# Patient Record
Sex: FEMALE | Race: WHITE | NOT HISPANIC OR LATINO | URBAN - METROPOLITAN AREA
[De-identification: names, ages, dates, MRNs, and addresses within clinical notes are randomized per-mention and may not be internally consistent; named-entity substitution may affect disease eponyms.]

---

## 2020-09-01 ENCOUNTER — TELEPHONE ENCOUNTER (OUTPATIENT)
Dept: URBAN - METROPOLITAN AREA CLINIC 92 | Facility: CLINIC | Age: 45
End: 2020-09-01

## 2020-09-01 NOTE — HPI-TODAY'S VISIT:
This is a scheduled follow-up appointment for this patient, a 44 year old /White female, after a previous visit on May 2020, for an evaluation for hepatitis C Ab pos but pcr negative state. A copy of this note has been sent to the referring provider. She was diagnosed with hepatitis C after routine blood testing.  She is asymptomatic. The patient denies abdominal pain and nausea She denies alcohol use. she denies illegal drug use presently. She denies a prior history of illicit drug use.  The patient has not had any previous evaluation for hepatitis C.     Pt here for hep c reported hx.  RECAP: She was dx in 2010 after ob visit.  She didn't receive tx at that time.  Two years later she was checked again and still positive.   She had a baby in 2007 and was not told she had hep c then.  Oct 31 2018 mri with left adrenal nodule 3.2x1.9cm and they recommend redo mri in 12 m for this. No hepatic steatosis seen and possible focus of shunting and they recommend follow up. area was 9mm size and liver fat 3% and spleen normal.  Mri done as prior u/s suspected in sept 2018 cirrhosis and diffuse fat. Nodularity was seen and no lesions. no hydronephrosis and visualized pancreas normal. Vessels patent.  So very discordant mri to u.s so avoiding u.s in her as not able to follow with that modality.  Sept 2018 hcv ab neg and hep a immune and hep b immune 35 and hcv pcr negative.  July 2019 mri:   Document Type: MRI Abdomen w/ + w/o Contrast Document Date: July 25, 2019 14:55  Document Status: Auth (Verified) Document Title: MRI Abdomen w/ + w/o Contrast Performed By: Argelia Fairchild  Verified By: Argelia Fairchild on July 25, 2019 16:32  Encounter info: [id number], [facility], Single Visit OP, 7/25/2019 - 7/25/2019   * Final Report *  Reason For Exam ADRENAL NODULE  REPORT EXAM: MRI Abdomen w/ + w/o Contrast  CLINICAL INDICATION: ADRENAL NODULE.  TECHNIQUE: Multisequence, multiplanar MRI of the abdomen was performed without and with intravenous contrast. ESRC.2.7.3   CONTRAST: 15 cc of Prohance  COMPARISON: None  FINDINGS:  Lower Thorax: Normal.  Liver: No fat or iron. Normal.   Gallbladder/Biliary Tree: Normal.  Spleen: Normal.  Pancreas: Normal.  Adrenal Glands: Within the left adrenal gland, there is a 3.4 x 2 cm enhancing nodule with intravoxel fat (drop in signal on out of phase images), compatible with a lipid rich adenoma.   Kidneys/Ureters: Normal.  Gastrointestinal: Normal.   Lymph Nodes: Normal.  Vessels: Normal.  Peritoneum/Retroperitoneum: Normal.  Bones/Soft Tissues: Normal.  IMPRESSION:     A 3.4 cm lipid rich adenoma within the left adrenal gland.    Signature Line *** Final ***  Electronically Signed By:  Argelia Fairchild on  07/25/2019 16:32  Dictated by:  Argelia Fairchild  Sees Endocrine sept 4 2019 at [place].  March 2019 glu 97 bun 10 and cr 0.68 and na 138 and k 4 and alb 4.3 and tb 0.6 and alk 96 and ast 12 and alt 11.   8/28/28 labs na 139 k 4.2 tb 0.7 alp 92 ast 12 alt 11 hemoglobin a1c 5.2% hep a ab igm neg hep b core ab igm neg hep b surf ag neg hep c ab positive 1.86, pcr negative total cholesterol 230 ldl 146 trig 155 hdl 55 tsh 0.66 vit d low 14 wbc 8.2 hgb 13.3 plt 282 gluc 91 cr 0.68  We reviewed with the pt re findings.   Plan is for a 1 yr visit and she will follow with endocrine for the adenoma and if all stable then she will come back as prn.  Duration of visit  mins with over 50% of the time explaining pt's condition and treatment plan and reviewing records.

## 2020-09-03 ENCOUNTER — OFFICE VISIT (OUTPATIENT)
Dept: URBAN - METROPOLITAN AREA TELEHEALTH 2 | Facility: TELEHEALTH | Age: 45
End: 2020-09-03

## 2020-10-18 ENCOUNTER — TELEPHONE ENCOUNTER (OUTPATIENT)
Dept: URBAN - METROPOLITAN AREA CLINIC 92 | Facility: CLINIC | Age: 45
End: 2020-10-18

## 2020-10-18 NOTE — HPI-TODAY'S VISIT:
This is a scheduled follow-up appointment for this patient, a 45 year old /White female, after a previous visit on Oct 2019 , for an evaluation for hepatitis C Ab pos but pcr negative state. A copy of this note has been sent to the referring provider. She was diagnosed with hepatitis C after routine blood testing.  She is asymptomatic. The patient denies abdominal pain and nausea She denies alcohol use. she denies illegal drug use presently. She denies a prior history of illicit drug use.  The patient has not had any previous evaluation for hepatitis C.     Pt here for hep c reported hx.  RECAP: She was dx in 2010 after ob visit.  She didn't receive tx at that time.  Two years later she was checked again and still positive.   She had a baby in 2007 and was not told she had hep c then.  Oct 31 2018 mri with left adrenal nodule 3.2x1.9cm and they recommend redo mri in 12 m for this. No hepatic steatosis seen and possible focus of shunting and they recommend follow up. area was 9mm size and liver fat 3% and spleen normal.  Mri done as prior u/s suspected in sept 2018 cirrhosis and diffuse fat. Nodularity was seen and no lesions. no hydronephrosis and visualized pancreas normal. Vessels patent.  So very discordant mri to u.s so avoiding u.s in her as not able to follow with that modality.  Sept 2018 hcv ab neg and hep a immune and hep b immune 35 and hcv pcr negative.  July 2019 mri:   Document Type: MRI Abdomen w/ + w/o Contrast Document Date: July 25, 2019 14:55  Document Status: Auth (Verified) Document Title: MRI Abdomen w/ + w/o Contrast Performed By: Argelia Fairchild  Verified By: Argelia Fairchild on July 25, 2019 16:32  Encounter info: [id number], [facility], Single Visit OP, 7/25/2019 - 7/25/2019   * Final Report *  Reason For Exam ADRENAL NODULE  REPORT EXAM: MRI Abdomen w/ + w/o Contrast  CLINICAL INDICATION: ADRENAL NODULE.  TECHNIQUE: Multisequence, multiplanar MRI of the abdomen was performed without and with intravenous contrast. ESRC.[id number]   CONTRAST: 15 cc of Prohance  COMPARISON: None  FINDINGS:  Lower Thorax: Normal.  Liver: No fat or iron. Normal.   Gallbladder/Biliary Tree: Normal.  Spleen: Normal.  Pancreas: Normal.  Adrenal Glands: Within the left adrenal gland, there is a 3.4 x 2 cm enhancing nodule with intravoxel fat (drop in signal on out of phase images), compatible with a lipid rich adenoma.   Kidneys/Ureters: Normal.  Gastrointestinal: Normal.   Lymph Nodes: Normal.  Vessels: Normal.  Peritoneum/Retroperitoneum: Normal.  Bones/Soft Tissues: Normal.  IMPRESSION:     A 3.4 cm lipid rich adenoma within the left adrenal gland.    Signature Line *** Final ***  Electronically Signed By:  Argelia Fairchild on  07/25/2019 16:32  Dictated by:  Argelia Fairchild  Sees Endocrine sept 4 2019 at [place].  March 2019 glu 97 bun 10 and cr 0.68 and na 138 and k 4 and alb 4.3 and tb 0.6 and alk 96 and ast 12 and alt 11.   8/28/28 labs na 139 k 4.2 tb 0.7 alp 92 ast 12 alt 11 hemoglobin a1c 5.2% hep a ab igm neg hep b core ab igm neg hep b surf ag neg hep c ab positive 1.86, pcr negative total cholesterol 230 ldl 146 trig 155 hdl 55 tsh 0.66 vit d low 14 wbc 8.2 hgb 13.3 plt 282 gluc 91 cr 0.68  We reviewed with the pt re findings.   Plan is for a 1 yr visit and she will follow with endocrine for the adenoma and if all stable then she will come back as prn.  Duration of visit  mins with over 50% of the time explaining pt's condition and treatment plan and reviewing records.

## 2020-10-23 ENCOUNTER — LAB OUTSIDE AN ENCOUNTER (OUTPATIENT)
Dept: URBAN - METROPOLITAN AREA TELEHEALTH 2 | Facility: TELEHEALTH | Age: 45
End: 2020-10-23

## 2020-10-23 ENCOUNTER — OFFICE VISIT (OUTPATIENT)
Dept: URBAN - METROPOLITAN AREA TELEHEALTH 2 | Facility: TELEHEALTH | Age: 45
End: 2020-10-23
Payer: COMMERCIAL

## 2020-10-23 DIAGNOSIS — Z71.89 VACCINE COUNSELING: ICD-10-CM

## 2020-10-23 DIAGNOSIS — E78.5 HYPERLIPIDEMIA: ICD-10-CM

## 2020-10-23 DIAGNOSIS — R76.8 HCV ANTIBODY POSITIVE: ICD-10-CM

## 2020-10-23 DIAGNOSIS — E66.3 OVERWEIGHT: ICD-10-CM

## 2020-10-23 DIAGNOSIS — Z79.899 HIGH RISK MEDICATION USE: ICD-10-CM

## 2020-10-23 DIAGNOSIS — K76.0 FATTY LIVER: ICD-10-CM

## 2020-10-23 DIAGNOSIS — E55.9 VITAMIN D DEFICIENCY: ICD-10-CM

## 2020-10-23 DIAGNOSIS — D32.9 MENINGIOMA: ICD-10-CM

## 2020-10-23 DIAGNOSIS — R93.2 ABNORMAL ULTRASOUND OF LIVER: ICD-10-CM

## 2020-10-23 DIAGNOSIS — K76.9 LIVER LESION: ICD-10-CM

## 2020-10-23 DIAGNOSIS — E27.8 ADRENAL NODULE: ICD-10-CM

## 2020-10-23 PROCEDURE — G8482 FLU IMMUNIZE ORDER/ADMIN: HCPCS

## 2020-10-23 PROCEDURE — G8417 CALC BMI ABV UP PARAM F/U: HCPCS

## 2020-10-23 PROCEDURE — G8427 DOCREV CUR MEDS BY ELIG CLIN: HCPCS

## 2020-10-23 PROCEDURE — 99214 OFFICE O/P EST MOD 30 MIN: CPT

## 2020-10-23 PROCEDURE — G9903 PT SCRN TBCO ID AS NON USER: HCPCS

## 2020-10-23 NOTE — HPI-TODAY'S VISIT:
This is a scheduled follow-up appointment for this patient, a 45 year old /White female, after a previous visit on Oct 2019 , for an evaluation for hepatitis C Ab pos but pcr negative state and hx of adrenal nodule.  A copy of this note has been sent to the referring provider.   Dr eBss is primary.  RECAP:  She was diagnosed with hepatitis C after routine blood testing.    She is asymptomatic. She had no exposures.  The patient has not had any previous evaluation for hepatitis C.    Pt here for hep c reported hx.   She was dx in 2010 after ob visit.  She didn't receive tx at that time.  Two years later she was checked again and still positive.   She had a baby in 2007 and was not told she had hep c then.  Interval results:    		 Document Type:	CT Abdomen w/o IV Contrast Document Date:	September 28, 2020 13:19  Document Status:	Auth (Verified) Document Title:	CT Abdomen w/o IV Contrast Performed By:	Jeniffer Smith  Verified By:	Jeniffer Smith on September 28, 2020 13:33  Encounter info:	[id number][facility], 9/28/2020 - 9/28/2020   * Final Report *  Reason For Exam Other;Other; Other; 3.4 cm left adrenal nodule  REPORT EXAM: CT Abdomen w/o IV Contrast  CLINICAL INDICATION: Left adrenal nodule seen on MRI  TECHNIQUE: Noncontrast images were obtained through the abdomen. ESRC.1.7.2  COMPARISON: 7/25/2019  FINDINGS:  Lower Thorax: Normal.  Liver: Normal.  Gallbladder/Biliary Tree: Normal.  Spleen: Normal.  Pancreas: Normal.  Adrenal Glands: Unchanged left adrenal lipid rich adenoma measuring 3.4 x 2.0 cm.   Kidneys/Ureters: Normal.  Gastrointestinal: Normal.   Lymph Nodes: Normal.  Vessels: Normal.  Peritoneum/Retroperitoneum: Normal.  Bones/Soft Tissues: Normal.  IMPRESSION:  Unchanged left adrenal lipid-rich adenoma    Signature Line *** Final ***  Electronically Signed By:  Jeniffer Simth on  09/28/2020 13:33  Dictated by:  Jeniffer Smith  [place] endocrine ordered this and she says told that it was unchanged and plan to relook at it in a year.  Document Type:	MRI Abdomen w/ + w/o Contrast Document Date:	July 25, 2019 14:55  Document Status:	Auth (Verified) Document Title:	MRI Abdomen w/ + w/o Contrast Performed By:	Argelia Fairchild  Verified By:	Argelia Fairchild on July 25, 2019 16:32  Encounter info:	[id number], [facility] Single Visit OP, 7/25/2019 - 7/25/2019   * Final Report *  Reason For Exam ADRENAL NODULE  REPORT EXAM: MRI Abdomen w/ + w/o Contrast  CLINICAL INDICATION: ADRENAL NODULE.  TECHNIQUE: Multisequence, multiplanar MRI of the abdomen was performed without and with intravenous contrast. ESRC.2.7.3   CONTRAST: 15 cc of Prohance  COMPARISON: None  FINDINGS:  Lower Thorax: Normal.  Liver: No fat or iron. Normal.   Gallbladder/Biliary Tree: Normal.  Spleen: Normal.  Pancreas: Normal.  Adrenal Glands: Within the left adrenal gland, there is a 3.4 x 2 cm enhancing nodule with intravoxel fat (drop in signal on out of phase images), compatible with a lipid rich adenoma.   Kidneys/Ureters: Normal.  Gastrointestinal: Normal.   Lymph Nodes: Normal.  Vessels: Normal.  Peritoneum/Retroperitoneum: Normal.  Bones/Soft Tissues: Normal.  IMPRESSION:     A 3.4 cm lipid rich adenoma within the left adrenal gland.    Signature Line *** Final ***  Electronically Signed By:  Argelia Fairchild on  07/25/2019 16:32  Dictated by:  Argelia Fairchild  She has not done her cortisol for them this year.  She  has not done labs for us either.  Oct 31 2018 mri with left adrenal nodule 3.2x1.9cm and they had recommend redo mri in 12 m for this. No hepatic steatosis seen and possible focus of shunting and they recommend follow up. area was 9mm size and liver fat 3% and spleen normal.  Mri done as prior u/s suspected in sept 2018 cirrhosis and diffuse fat. Nodularity was seen and no lesions. no hydronephrosis and visualized pancreas normal. Vessels patent.  Sept 2018 hcv ab neg and hep a immune and hep b immune 35 and hcv pcr negative.  March 2019 glu 97 and cr 0.68 and na 138 and k 4.0 and cl 103 and co2 25, tb 0.6 and alk 96 and ast 12 and alt 11.  Sees Endocrine Next summer 2021. .   8/28/28 labs na 139 k 4.2 tb 0.7 alp 92 ast 12 alt 11 hemoglobin a1c 5.2% hep a ab igm neg hep b core ab igm neg hep b surf ag neg hep c ab positive 1.86, pcr negative total cholesterol 230 ldl 146 trig 155 hdl 55 tsh 0.66 vit d low 14 wbc 8.2 hgb 13.3 plt 282 gluc 91 cr 0.68   Plan: 1. I would do the cmp and cbc at the [place] office and plan for a 1 yr visit. 2. Since [place] endocrine and the scan see after that. 3. See post scann.  Stressed to pt the need for social distancing and strict handwashing and wearing a mask and to follow any other new or added CDC recommendations as this is an evolving target.  Duration of visit 25  mins (healow for 1m and 24 by doximity video)  with over 50% of the time explaining pt's condition and treatment plan and reviewing records.

## 2020-11-10 ENCOUNTER — TELEPHONE ENCOUNTER (OUTPATIENT)
Dept: URBAN - METROPOLITAN AREA CLINIC 92 | Facility: CLINIC | Age: 45
End: 2020-11-10

## 2020-11-10 LAB
A/G RATIO: 1.4
ALBUMIN: 4.5
ALKALINE PHOSPHATASE: 92
ALT (SGPT): 12
AST (SGOT): 13
BASO (ABSOLUTE): 0
BASOS: 0
BILIRUBIN, TOTAL: 0.5
BUN/CREATININE RATIO: 16
BUN: 13
CALCIUM: 9.2
CARBON DIOXIDE, TOTAL: 24
CHLORIDE: 104
CREATININE: 0.81
EGFR IF AFRICN AM: 102
EGFR IF NONAFRICN AM: 89
EOS (ABSOLUTE): 0
EOS: 0
GLOBULIN, TOTAL: 3.2
GLUCOSE: 115
HCV AB: <0.1
HEMATOCRIT: 38.6
HEMATOLOGY COMMENTS:: (no result)
HEMOGLOBIN: 13.1
IMMATURE CELLS: (no result)
IMMATURE GRANS (ABS): 0
IMMATURE GRANULOCYTES: 0
INTERPRETATION:: (no result)
LYMPHS (ABSOLUTE): 1.7
LYMPHS: 22
MCH: 30.3
MCHC: 33.9
MCV: 89
MONOCYTES(ABSOLUTE): 0.4
MONOCYTES: 5
NEUTROPHILS (ABSOLUTE): 5.7
NEUTROPHILS: 73
NRBC: (no result)
PLATELETS: 283
POTASSIUM: 4.4
PROTEIN, TOTAL: 7.7
RBC: 4.32
RDW: 12.7
SODIUM: 139
WBC: 7.9

## 2020-11-10 NOTE — HPI-OTHER HISTORIES
Dear Mica Brooke The results of your recent tests are explained below: nov 9 wbc 7.9 and hg 13.1 plat 283 and tb 0.5 and alk 92 and ast 13 and alt 12  (ideal alt <19 or 25). na 139 and k 4.4 and glu 115 little up and maybe not fasting? Show local md. bun 13 and cr 0.81. HCV ab negative so no reflex done. So now even the ab test is negative so this is usually a sign of false positive as we teach out hcv pts that they remain antibody positive for life even after get treated.

## 2021-02-18 ENCOUNTER — TELEPHONE ENCOUNTER (OUTPATIENT)
Dept: URBAN - METROPOLITAN AREA CLINIC 6 | Facility: CLINIC | Age: 46
End: 2021-02-18

## 2021-10-23 ENCOUNTER — LAB OUTSIDE AN ENCOUNTER (OUTPATIENT)
Dept: URBAN - METROPOLITAN AREA TELEHEALTH 2 | Facility: TELEHEALTH | Age: 46
End: 2021-10-23

## 2021-10-25 ENCOUNTER — LAB OUTSIDE AN ENCOUNTER (OUTPATIENT)
Dept: URBAN - METROPOLITAN AREA TELEHEALTH 2 | Facility: TELEHEALTH | Age: 46
End: 2021-10-25

## 2021-10-25 ENCOUNTER — OFFICE VISIT (OUTPATIENT)
Dept: URBAN - METROPOLITAN AREA TELEHEALTH 2 | Facility: TELEHEALTH | Age: 46
End: 2021-10-25
Payer: COMMERCIAL

## 2021-10-25 DIAGNOSIS — K76.9 LIVER LESION: ICD-10-CM

## 2021-10-25 DIAGNOSIS — Z79.899 HIGH RISK MEDICATION USE: ICD-10-CM

## 2021-10-25 DIAGNOSIS — Z71.89 VACCINE COUNSELING: ICD-10-CM

## 2021-10-25 DIAGNOSIS — R93.2 ABNORMAL ULTRASOUND OF LIVER: ICD-10-CM

## 2021-10-25 DIAGNOSIS — E66.3 OVERWEIGHT: ICD-10-CM

## 2021-10-25 DIAGNOSIS — D32.9 MENINGIOMA: ICD-10-CM

## 2021-10-25 DIAGNOSIS — K76.0 FATTY LIVER: ICD-10-CM

## 2021-10-25 DIAGNOSIS — E27.8 ADRENAL NODULE: ICD-10-CM

## 2021-10-25 DIAGNOSIS — E78.5 HYPERLIPIDEMIA: ICD-10-CM

## 2021-10-25 DIAGNOSIS — E55.9 VITAMIN D DEFICIENCY: ICD-10-CM

## 2021-10-25 DIAGNOSIS — R76.8 HCV ANTIBODY POSITIVE: ICD-10-CM

## 2021-10-25 PROCEDURE — 99214 OFFICE O/P EST MOD 30 MIN: CPT

## 2021-10-25 NOTE — HPI-TODAY'S VISIT:
This is a scheduled follow-up appointment for this patient, a 46 year old /White female, after a previous visit on Oct 2020 , for an evaluation for hepatitis C Ab pos but pcr negative state and hx of adrenal nodule.  A copy of this note has been sent to the referring provider.   Dr Bess is primary.  Nov 9 2020 wbc 7.9 and hg 13.1 plat 283 and tb 0.5 and alk 92 and ast 13 and alt 12  (ideal alt <19 or 25). na 139 and k 4.4 and glu 115 little up and maybe not fasting? Show local md. bun 13 and cr 0.81. HCV ab negative so no reflex done. So now even the ab test is negative so this is usually a sign of false positive as we teach out hcv pts that they remain antibody positive for life even after get treated.  No labs done.  RECAP:  She was diagnosed with hepatitis C  ab after routine blood testing.   She is asymptomatic. She had no exposures.  The patient has not had any previous evaluation for hepatitis C.    Pt here for hep c reported hx.  She was dx in 2010 after ob visit.  She didn't receive tx at that time.  Two years later she was checked again and still positive.   She had a baby in 2007 and was not told she had hep c then.   		 Document Type:	CT Abdomen w/o IV Contrast Document Date:	September 28, 2020 13:19  Document Status:	Auth (Verified) Document Title:	CT Abdomen w/o IV Contrast Performed By:	Jeniffer Smith  Verified By:	Jeniffer Smith on September 28, 2020 13:33  Encounter info:	[id number], [facility], Single Visit OP, 9/28/2020 - 9/28/2020   * Final Report *  Reason For Exam Other;Other; Other; 3.4 cm left adrenal nodule  REPORT EXAM: CT Abdomen w/o IV Contrast  CLINICAL INDICATION: Left adrenal nodule seen on MRI  TECHNIQUE: Noncontrast images were obtained through the abdomen. ESRC.1.7.2  COMPARISON: 7/25/2019  FINDINGS:  Lower Thorax: Normal.  Liver: Normal.  Gallbladder/Biliary Tree: Normal.  Spleen: Normal.  Pancreas: Normal.  Adrenal Glands: Unchanged left adrenal lipid rich adenoma measuring 3.4 x 2.0 cm.   Kidneys/Ureters: Normal.  Gastrointestinal: Normal.   Lymph Nodes: Normal.  Vessels: Normal.  Peritoneum/Retroperitoneum: Normal.  Bones/Soft Tissues: Normal.  IMPRESSION:  Unchanged left adrenal lipid-rich adenoma    Signature Line *** Final ***  Electronically Signed By:  Jeniffer Smith on  09/28/2020 13:33  Dictated by:  Jeniffer Smith  [place] endocrine ordered this and she says told that it was unchanged and plan to relook at it in a year.  She has not done this.  We saw neurosurgery team tried to call in Dec 15 and not able to get and so need to get that appt.  Dr Emely Givens and she wanted to do a one year scan follow up.  Document Type:	MRI Abdomen w/ + w/o Contrast Document Date:	July 25, 2019 14:55  Document Status:	Auth (Verified) Document Title:	MRI Abdomen w/ + w/o Contrast Performed By:	Argelia Fairchild  Verified By:	Argelia Fairchild on July 25, 2019 16:32  Encounter info:	[id number], [facility], Single Visit OP, 7/25/2019 - 7/25/2019   * Final Report *  Reason For Exam ADRENAL NODULE  REPORT EXAM: MRI Abdomen w/ + w/o Contrast  CLINICAL INDICATION: ADRENAL NODULE.  TECHNIQUE: Multisequence, multiplanar MRI of the abdomen was performed without and with intravenous contrast. ESRC.2.7.3   CONTRAST: 15 cc of Prohance  COMPARISON: None  FINDINGS:  Lower Thorax: Normal.  Liver: No fat or iron. Normal.   Gallbladder/Biliary Tree: Normal.  Spleen: Normal.  Pancreas: Normal.  Adrenal Glands: Within the left adrenal gland, there is a 3.4 x 2 cm enhancing nodule with intravoxel fat (drop in signal on out of phase images), compatible with a lipid rich adenoma.   Kidneys/Ureters: Normal.  Gastrointestinal: Normal.   Lymph Nodes: Normal.  Vessels: Normal.  Peritoneum/Retroperitoneum: Normal.  Bones/Soft Tissues: Normal.  IMPRESSION:     A 3.4 cm lipid rich adenoma within the left adrenal gland.    Signature Line *** Final ***  Electronically Signed By:  Argelia Fairchild on  07/25/2019 16:32  Dictated by:  Argelia Fairchild   Oct 31 2018 mri with left adrenal nodule 3.2x1.9cm and they had recommend redo mri in 12 m for this. No hepatic steatosis seen and possible focus of shunting and they recommend follow up. area was 9mm size and liver fat 3% and spleen normal.  Mri done as prior u/s suspected in sept 2018 cirrhosis and diffuse fat. Nodularity was seen and no lesions. no hydronephrosis and visualized pancreas normal. Vessels patent.  Sept 2018 hcv ab neg and hep a immune and hep b immune 35 and hcv pcr negative.  March 2019 glu 97 and cr 0.68 and na 138 and k 4.0 and cl 103 and co2 25, tb 0.6 and alk 96 and ast 12 and alt 11.    8/28/28 labs na 139 k 4.2 tb 0.7 alp 92 ast 12 alt 11 hemoglobin a1c 5.2% hep a ab igm neg hep b core ab igm neg hep b surf ag neg hep c ab positive 1.86, pcr negative total cholesterol 230 ldl 146 trig 155 hdl 55 tsh 0.66 vit d low 14 wbc 8.2 hgb 13.3 plt 282 gluc 91 cr 0.68   Plan: 1. I would do the cmp and cbc at the [place] and she can get that done and then we can send to Dr Givens as need and she can let us know to send. 2. Due for the ct for adrenal lesion and that needs to be done with endocrine. 3. She will follow up with neuro team re the meningioma. 4. RTC in 6m. 5. HCV redo the confirmation.  Stressed to pt the need for social distancing and strict handwashing and wearing a mask and to follow any other new or added CDC recommendations as this is an evolving target.  Duration of visit  32 mins with 5 min of prep and then 27 min by healow  with over 50% of the time explaining pt's condition and treatment plan and reviewing records. Include Location In Plan?: No Detail Level: Zone Detail Level: Generalized

## 2021-11-13 ENCOUNTER — TELEPHONE ENCOUNTER (OUTPATIENT)
Dept: URBAN - METROPOLITAN AREA CLINIC 92 | Facility: CLINIC | Age: 46
End: 2021-11-13

## 2021-11-13 LAB
A/G RATIO: 1.4
ALBUMIN: 4.3
ALKALINE PHOSPHATASE: 70
ALT (SGPT): 15
AST (SGOT): 18
BASO (ABSOLUTE): 0
BASOS: 0
BILIRUBIN, TOTAL: 0.5
BUN/CREATININE RATIO: 14
BUN: 11
CALCIUM: 9.1
CARBON DIOXIDE, TOTAL: 25
CHLORIDE: 105
CREATININE: 0.78
EGFR IF AFRICN AM: 106
EGFR IF NONAFRICN AM: 92
EOS (ABSOLUTE): 0.2
EOS: 3
GLOBULIN, TOTAL: 3.1
GLUCOSE: 102
HCV AB: 0.3
HEMATOCRIT: 39.4
HEMATOLOGY COMMENTS:: (no result)
HEMOGLOBIN: 13
IMMATURE CELLS: (no result)
IMMATURE GRANS (ABS): 0
IMMATURE GRANULOCYTES: 0
INTERPRETATION:: (no result)
LYMPHS (ABSOLUTE): 1.8
LYMPHS: 39
MCH: 30.6
MCHC: 33
MCV: 93
MONOCYTES(ABSOLUTE): 0.4
MONOCYTES: 9
NEUTROPHILS (ABSOLUTE): 2.2
NEUTROPHILS: 49
NRBC: (no result)
PLATELETS: 247
POTASSIUM: 4.2
PROTEIN, TOTAL: 7.4
RBC: 4.25
RDW: 12.4
SODIUM: 141
WBC: 4.6

## 2021-11-13 NOTE — HPI-TODAY'S VISIT:
Dear Mica Brooke, November 12 labs show glucose slightly up at 102 previously last year was 115. BUN of 11 creatinine 0.78 sodium 141 potassium 4.2 chloride 105 calcium 9.1 albumin 4.3 bilirubin 0.5 alkaline phosphatase 70 AST 18 and ALT 15. Ideal ALT is less than 25 so you are doing well there. Last year your AST was 13 and ALT was 12 so these are very similar. White blood cell count 4.6 hemoglobin 13 platelet count 247 MCV 93 and neutrophils 2.2 and lymphocytes 1.8 so these continue to be in the normal range. Hep C antibody was noted to be negative at 0.3. This continues to reconfirm that the hep C antibody was likely a false positive test or a prior exposure that has since faded. Dr. Alcaraz

## 2022-04-19 ENCOUNTER — TELEPHONE ENCOUNTER (OUTPATIENT)
Dept: URBAN - METROPOLITAN AREA CLINIC 92 | Facility: CLINIC | Age: 47
End: 2022-04-19

## 2022-04-19 LAB
A/G RATIO: 1.3
ALBUMIN: 4.3
ALKALINE PHOSPHATASE: 72
ALT (SGPT): 16
AST (SGOT): 14
BASO (ABSOLUTE): 0
BASOS: 1
BILIRUBIN, TOTAL: 0.4
BUN/CREATININE RATIO: 16
BUN: 13
CALCIUM: 8.8
CARBON DIOXIDE, TOTAL: 23
CHLORIDE: 106
CREATININE: 0.79
EGFR: 94
EOS (ABSOLUTE): 0.2
EOS: 4
GLOBULIN, TOTAL: 3.3
GLUCOSE: 105
HEMATOCRIT: 41
HEMATOLOGY COMMENTS:: (no result)
HEMOGLOBIN: 13.5
IMMATURE CELLS: (no result)
IMMATURE GRANS (ABS): 0
IMMATURE GRANULOCYTES: 0
LYMPHS (ABSOLUTE): 2
LYMPHS: 37
MCH: 30.8
MCHC: 32.9
MCV: 94
MONOCYTES(ABSOLUTE): 0.5
MONOCYTES: 9
NEUTROPHILS (ABSOLUTE): 2.7
NEUTROPHILS: 49
NRBC: (no result)
PLATELETS: 272
POTASSIUM: 4.7
PROTEIN, TOTAL: 7.6
RBC: 4.38
RDW: 12.7
SODIUM: 141
WBC: 5.4

## 2022-04-19 NOTE — HPI-TODAY'S VISIT:
Dear Mica Brooke, April 18 labs show sugar slightly up at 105 previously was 102.  BUN of 13 creatinine 0.79 sodium 141 potassium 4.7 calcium 8.8 albumin 4.3 bilirubin 0.4 alkaline phosphatase 72 AST of 14 and ALT of 16.  Previously AST 18 ALT 15. White blood cell count 5.4 hemoglobin 13.5 plate count 272 MCV 94.  Neutrophils 2.7 and lymphocytes 2.0. Please share with primary provider. Dr. Alcaraz

## 2022-04-25 ENCOUNTER — LAB OUTSIDE AN ENCOUNTER (OUTPATIENT)
Dept: URBAN - METROPOLITAN AREA TELEHEALTH 2 | Facility: TELEHEALTH | Age: 47
End: 2022-04-25

## 2022-04-25 ENCOUNTER — OFFICE VISIT (OUTPATIENT)
Dept: URBAN - METROPOLITAN AREA TELEHEALTH 2 | Facility: TELEHEALTH | Age: 47
End: 2022-04-25

## 2022-04-26 ENCOUNTER — OFFICE VISIT (OUTPATIENT)
Dept: URBAN - METROPOLITAN AREA TELEHEALTH 2 | Facility: TELEHEALTH | Age: 47
End: 2022-04-26

## 2022-04-26 NOTE — HPI-TODAY'S VISIT:
This is a scheduled follow-up appointment for this patient, a 46 year old /White female, after a previous visit on Oct 2021 , for an evaluation for hepatitis C Ab pos but pcr negative state and hx of adrenal nodule.  A copy of this note has been sent to the referring provider.   Dr Bess is primary.  Dear Mica Brooke, November 12 labs show glucose slightly up at 102 previously last year was 115. BUN of 11 creatinine 0.78 sodium 141 potassium 4.2 chloride 105 calcium 9.1 albumin 4.3 bilirubin 0.5 alkaline phosphatase 70 AST 18 and ALT 15. Ideal ALT is less than 25 so you are doing well there. Last year your AST was 13 and ALT was 12 so these are very similar. White blood cell count 4.6 hemoglobin 13 platelet count 247 MCV 93 and neutrophils 2.2 and lymphocytes 1.8 so these continue to be in the normal range. Hep C antibody was noted to be negative at 0.3. This continues to reconfirm that the hep C antibody was likely a false positive test or a prior exposure that has since faded. Dr. Alcaraz   Nov 9 2020 wbc 7.9 and hg 13.1 plat 283 and tb 0.5 and alk 92 and ast 13 and alt 12  (ideal alt <19 or 25). na 139 and k 4.4 and glu 115 little up and maybe not fasting? Show local md. bun 13 and cr 0.81. HCV ab negative so no reflex done. So now even the ab test is negative so this is usually a sign of false positive as we teach out hcv pts that they remain antibody positive for life even after get treated.  No labs done.  RECAP:  She was diagnosed with hepatitis C  ab after routine blood testing.   She is asymptomatic. She had no exposures.  The patient has not had any previous evaluation for hepatitis C.    Pt here for hep c reported hx.  She was dx in 2010 after ob visit.  She didn't receive tx at that time.  Two years later she was checked again and still positive.   She had a baby in 2007 and was not told she had hep c then.   		 Document Type:	CT Abdomen w/o IV Contrast Document Date:	September 28, 2020 13:19  Document Status:	Auth (Verified) Document Title:	CT Abdomen w/o IV Contrast Performed By:	Jeniffer Smith  Verified By:	Jeniffer Smith on September 28, 2020 13:33  Encounter info:	[id number], [facility], Single Visit OP, 9/28/2020 - 9/28/2020   * Final Report *  Reason For Exam Other;Other; Other; 3.4 cm left adrenal nodule  REPORT EXAM: CT Abdomen w/o IV Contrast  CLINICAL INDICATION: Left adrenal nodule seen on MRI  TECHNIQUE: Noncontrast images were obtained through the abdomen. ESRC.1.7.2  COMPARISON: 7/25/2019  FINDINGS:  Lower Thorax: Normal.  Liver: Normal.  Gallbladder/Biliary Tree: Normal.  Spleen: Normal.  Pancreas: Normal.  Adrenal Glands: Unchanged left adrenal lipid rich adenoma measuring 3.4 x 2.0 cm.   Kidneys/Ureters: Normal.  Gastrointestinal: Normal.   Lymph Nodes: Normal.  Vessels: Normal.  Peritoneum/Retroperitoneum: Normal.  Bones/Soft Tissues: Normal.  IMPRESSION:  Unchanged left adrenal lipid-rich adenoma    Signature Line *** Final ***  Electronically Signed By:  Jeniffer Smith on  09/28/2020 13:33  Dictated by:  Jeniffer Smith  [facility] ordered this and she says told that it was unchanged and plan to relook at it in a year.  She has not done this.  We saw neurosurgery team tried to call in Dec 15 and not able to get and so need to get that appt.  Dr Emely Givens and she wanted to do a one year scan follow up.  Document Type:	MRI Abdomen w/ + w/o Contrast Document Date:	July 25, 2019 14:55  Document Status:	Auth (Verified) Document Title:	MRI Abdomen w/ + w/o Contrast Performed By:	Argelia Fairchild  Verified By:	Argelia Fairchild on July 25, 2019 16:32  Encounter info:	[id number], [facility], Single Visit OP, 7/25/2019 - 7/25/2019   * Final Report *  Reason For Exam ADRENAL NODULE  REPORT EXAM: MRI Abdomen w/ + w/o Contrast  CLINICAL INDICATION: ADRENAL NODULE.  TECHNIQUE: Multisequence, multiplanar MRI of the abdomen was performed without and with intravenous contrast. ESR.2.7.3   CONTRAST: 15 cc of Prohance  COMPARISON: None  FINDINGS:  Lower Thorax: Normal.  Liver: No fat or iron. Normal.   Gallbladder/Biliary Tree: Normal.  Spleen: Normal.  Pancreas: Normal.  Adrenal Glands: Within the left adrenal gland, there is a 3.4 x 2 cm enhancing nodule with intravoxel fat (drop in signal on out of phase images), compatible with a lipid rich adenoma.   Kidneys/Ureters: Normal.  Gastrointestinal: Normal.   Lymph Nodes: Normal.  Vessels: Normal.  Peritoneum/Retroperitoneum: Normal.  Bones/Soft Tissues: Normal.  IMPRESSION:     A 3.4 cm lipid rich adenoma within the left adrenal gland.    Signature Line *** Final ***  Electronically Signed By:  Argelia Fairchild on  07/25/2019 16:32  Dictated by:  Argelia Fairchild   Oct 31 2018 mri with left adrenal nodule 3.2x1.9cm and they had recommend redo mri in 12 m for this. No hepatic steatosis seen and possible focus of shunting and they recommend follow up. area was 9mm size and liver fat 3% and spleen normal.  Mri done as prior u/s suspected in sept 2018 cirrhosis and diffuse fat. Nodularity was seen and no lesions. no hydronephrosis and visualized pancreas normal. Vessels patent.  Sept 2018 hcv ab neg and hep a immune and hep b immune 35 and hcv pcr negative.  March 2019 glu 97 and cr 0.68 and na 138 and k 4.0 and cl 103 and co2 25, tb 0.6 and alk 96 and ast 12 and alt 11.    8/28/28 labs na 139 k 4.2 tb 0.7 alp 92 ast 12 alt 11 hemoglobin a1c 5.2% hep a ab igm neg hep b core ab igm neg hep b surf ag neg hep c ab positive 1.86, pcr negative total cholesterol 230 ldl 146 trig 155 hdl 55 tsh 0.66 vit d low 14 wbc 8.2 hgb 13.3 plt 282 gluc 91 cr 0.68   Plan: 1. I would do the cmp and cbc at the [place] and she can get that done and then we can send to Dr Givens as need and she can let us know to send. 2. Due for the ct for adrenal lesion and that needs to be done with endocrine. 3. She will follow up with neuro team re the meningioma. 4. RTC in 6m. 5. HCV redo the confirmation.  Stressed to pt the need for social distancing and strict handwashing and wearing a mask and to follow any other new or added CDC recommendations as this is an evolving target.  Duration of visit  mins with 5 min of prep and then 27 min by healow  with over 50% of the time explaining pt's condition and treatment plan and reviewing records.

## 2022-05-16 ENCOUNTER — OFFICE VISIT (OUTPATIENT)
Dept: URBAN - METROPOLITAN AREA TELEHEALTH 2 | Facility: TELEHEALTH | Age: 47
End: 2022-05-16
Payer: COMMERCIAL

## 2022-05-16 DIAGNOSIS — Z79.899 HIGH RISK MEDICATION USE: ICD-10-CM

## 2022-05-16 DIAGNOSIS — K76.9 LIVER LESION: ICD-10-CM

## 2022-05-16 DIAGNOSIS — E66.3 OVERWEIGHT: ICD-10-CM

## 2022-05-16 DIAGNOSIS — R76.8 HCV ANTIBODY POSITIVE: ICD-10-CM

## 2022-05-16 DIAGNOSIS — E27.8 ADRENAL NODULE: ICD-10-CM

## 2022-05-16 DIAGNOSIS — Z71.89 VACCINE COUNSELING: ICD-10-CM

## 2022-05-16 DIAGNOSIS — E78.5 HYPERLIPIDEMIA: ICD-10-CM

## 2022-05-16 DIAGNOSIS — R93.2 ABNORMAL ULTRASOUND OF LIVER: ICD-10-CM

## 2022-05-16 DIAGNOSIS — D32.9 MENINGIOMA: ICD-10-CM

## 2022-05-16 DIAGNOSIS — E55.9 VITAMIN D DEFICIENCY: ICD-10-CM

## 2022-05-16 DIAGNOSIS — K76.0 FATTY LIVER: ICD-10-CM

## 2022-05-16 PROCEDURE — 99213 OFFICE O/P EST LOW 20 MIN: CPT

## 2022-05-16 NOTE — HPI-TODAY'S VISIT:
This is a scheduled follow-up appointment for this patient, a 46 year old /White female, after a previous visit on Oct 2021 , for an evaluation for hepatitis C Ab pos but pcr negative state and hx of adrenal nodule.  A copy of this note has been sent to the referring provider.   Dr HERON Bess is primary.   April 18 labs show sugar slightly up at 105 previously was 102.  BUN of 13 creatinine 0.79 sodium 141 potassium 4.7 calcium 8.8 albumin 4.3 bilirubin 0.4 alkaline phosphatase 72 AST of 14 and ALT of 16.  Previously AST 18 ALT 15. White blood cell count 5.4 hemoglobin 13.5 plate count 272 MCV 94.  Neutrophils 2.7 and lymphocytes 2.0. Please share with primary provider.   November 12 labs show glucose slightly up at 102 previously last year was 115. BUN of 11 creatinine 0.78 sodium 141 potassium 4.2 chloride 105 calcium 9.1 albumin 4.3 bilirubin 0.5 alkaline phosphatase 70 AST 18 and ALT 15. Ideal ALT is less than 25 so you are doing well there. Last year your AST was 13 and ALT was 12 so these are very similar. White blood cell count 4.6 hemoglobin 13 platelet count 247 MCV 93 and neutrophils 2.2 and lymphocytes 1.8 so these continue to be in the normal range. Hep C antibody was noted to be negative at 0.3. This continues to reconfirm that the hep C antibody was likely a false positive test or a prior exposure that has since faded. True pos hcv ab are positive for life.  Nov 9 2020 wbc 7.9 and hg 13.1 plat 283 and tb 0.5 and alk 92 and ast 13 and alt 12  (ideal alt <19 or 25). na 139 and k 4.4 and glu 115 little up and maybe not fasting? Show local md. bun 13 and cr 0.81. HCV ab negative so no reflex done. So now even the ab test is negative so this is usually a sign of false positive as we teach out hcv pts that they remain antibody positive for life even after get treated.  She says has been off on her sugars.  RECAP:  She was diagnosed with hepatitis C  ab after routine blood testing.   She is asymptomatic. She had no exposures.   Pt here for hep c reported hx.  She was dx in 2010 after ob visit.  She didn't receive tx at that time.  Two years later she was checked again and still positive.   She had a baby in 2007 and was not told she had hep c then.   		 Document Type:	CT Abdomen w/o IV Contrast Document Date:	September 28, 2020 13:19  Document Status:	Auth (Verified) Document Title:	CT Abdomen w/o IV Contrast Performed By:	Jeniffer Smith  Verified By:	Jeniffer Smith on September 28, 2020 13:33  Encounter info:	[id number], [facility], Single Visit OP, 9/28/2020 - 9/28/2020   * Final Report *  Reason For Exam Other;Other; Other; 3.4 cm left adrenal nodule  REPORT EXAM: CT Abdomen w/o IV Contrast  CLINICAL INDICATION: Left adrenal nodule seen on MRI  TECHNIQUE: Noncontrast images were obtained through the abdomen. ESRC.1.7.2  COMPARISON: 7/25/2019  FINDINGS:  Lower Thorax: Normal.  Liver: Normal.  Gallbladder/Biliary Tree: Normal.  Spleen: Normal.  Pancreas: Normal.  Adrenal Glands: Unchanged left adrenal lipid rich adenoma measuring 3.4 x 2.0 cm.   Kidneys/Ureters: Normal.  Gastrointestinal: Normal.   Lymph Nodes: Normal.  Vessels: Normal.  Peritoneum/Retroperitoneum: Normal.  Bones/Soft Tissues: Normal.  IMPRESSION:  Unchanged left adrenal lipid-rich adenoma    Signature Line *** Final ***  Electronically Signed By:  Jeniffer Smith on  09/28/2020 13:33  Dictated by:  Jeniffer Smith  She sees [facility] and she is following her. Says maybe relook in a year. Cortisol level.  We saw neurosurgery team tried to call in Dec 15 and not able to get and so need to get that appt.  Dr Emely Givens seeing her for this.  Document Type:	MRI Abdomen w/ + w/o Contrast Document Date:	July 25, 2019 14:55  Document Status:	Auth (Verified) Document Title:	MRI Abdomen w/ + w/o Contrast Performed By:	Argelia Fairchild  Verified By:	Argelia Fairchild on July 25, 2019 16:32  Encounter info:	[id number], [facility], Single Visit OP, 7/25/2019 - 7/25/2019   * Final Report *  Reason For Exam ADRENAL NODULE  REPORT EXAM: MRI Abdomen w/ + w/o Contrast  CLINICAL INDICATION: ADRENAL NODULE.  TECHNIQUE: Multisequence, multiplanar MRI of the abdomen was performed without and with intravenous contrast. ESRC.2.7.3   CONTRAST: 15 cc of Prohance  COMPARISON: None  FINDINGS:  Lower Thorax: Normal.  Liver: No fat or iron. Normal.   Gallbladder/Biliary Tree: Normal.  Spleen: Normal.  Pancreas: Normal.  Adrenal Glands: Within the left adrenal gland, there is a 3.4 x 2 cm enhancing nodule with intravoxel fat (drop in signal on out of phase images), compatible with a lipid rich adenoma.   Kidneys/Ureters: Normal.  Gastrointestinal: Normal.   Lymph Nodes: Normal.  Vessels: Normal.  Peritoneum/Retroperitoneum: Normal.  Bones/Soft Tissues: Normal.  IMPRESSION:     A 3.4 cm lipid rich adenoma within the left adrenal gland.    Signature Line *** Final ***  Electronically Signed By:  Argelia Fairchild on  07/25/2019 16:32  Dictated by:  Argelia Fairchild   Oct 31 2018 mri with left adrenal nodule 3.2x1.9cm and they had recommend redo mri in 12 m for this. No hepatic steatosis seen and possible focus of shunting and they recommend follow up. area was 9mm size and liver fat 3% and spleen normal.  Mri done as prior u/s suspected in sept 2018 cirrhosis and diffuse fat. Nodularity was seen and no lesions. no hydronephrosis and visualized pancreas normal. Vessels patent.  Sept 2018 hcv ab neg and hep a immune and hep b immune 35 and hcv pcr negative.  March 2019 glu 97 and cr 0.68 and na 138 and k 4.0 and cl 103 and co2 25, tb 0.6 and alk 96 and ast 12 and alt 11.    8/28/28 labs na 139 k 4.2 tb 0.7 alp 92 ast 12 alt 11 hemoglobin a1c 5.2% hep a ab igm neg hep b core ab igm neg hep b surf ag neg hep c ab positive 1.86, pcr negative total cholesterol 230 ldl 146 trig 155 hdl 55 tsh 0.66 vit d low 14 wbc 8.2 hgb 13.3 plt 282 gluc 91 cr 0.68   Plan: 1. She is doing weel and no issues. 2. Doing for the adrenal issues. 3. She may be due soon for the ct for adrenal lesion and that needs to be done with endocrine. 4. She will follow up with neuro team re the meningioma. 5. RTC in 6m in telemed.   Stressed to pt the need for social distancing and strict handwashing and wearing a mask and to follow any other new or added CDC recommendations as this is an evolving target.  Duration of visit 25 mins with 10 min of prep and then 15 min by healow  with over 50% of the time explaining pt's condition and treatment plan and reviewing records.

## 2022-11-01 ENCOUNTER — LAB OUTSIDE AN ENCOUNTER (OUTPATIENT)
Dept: URBAN - METROPOLITAN AREA TELEHEALTH 2 | Facility: TELEHEALTH | Age: 47
End: 2022-11-01

## 2022-11-09 ENCOUNTER — TELEPHONE ENCOUNTER (OUTPATIENT)
Dept: URBAN - METROPOLITAN AREA CLINIC 92 | Facility: CLINIC | Age: 47
End: 2022-11-09

## 2022-11-09 LAB
ALBUMIN: 4.4
ALKALINE PHOSPHATASE: 79
ALT (SGPT): 14
AST (SGOT): 12
BILIRUBIN, DIRECT: 0.11
BILIRUBIN, TOTAL: 0.4
HCV AB: 0.2
INTERPRETATION:: (no result)
PROTEIN, TOTAL: 7.4

## 2022-11-09 NOTE — HPI-TODAY'S VISIT:
Dear Mica Brooke, November 8 labs show hep C antibody negative at 0.2 and was previously 0.3 the year before.  Albumin normal at 4.4 bilirubin 0.4 direct bilirubin 0.11 alk phos 79 AST of 12 and ALT of 14.  Ideal ALT less than 25. We will review this further with you at your upcoming visit on November 22. Dr. Alcaraz

## 2022-11-18 ENCOUNTER — DASHBOARD ENCOUNTERS (OUTPATIENT)
Age: 47
End: 2022-11-18

## 2022-11-22 ENCOUNTER — CLAIMS CREATED FROM THE CLAIM WINDOW (OUTPATIENT)
Dept: URBAN - METROPOLITAN AREA TELEHEALTH 2 | Facility: TELEHEALTH | Age: 47
End: 2022-11-22
Payer: COMMERCIAL

## 2022-11-22 VITALS — WEIGHT: 140 LBS | HEIGHT: 63 IN | BODY MASS INDEX: 24.8 KG/M2

## 2022-11-22 DIAGNOSIS — Z79.899 HIGH RISK MEDICATION USE: ICD-10-CM

## 2022-11-22 DIAGNOSIS — D32.9 MENINGIOMA: ICD-10-CM

## 2022-11-22 DIAGNOSIS — K76.9 LIVER LESION: ICD-10-CM

## 2022-11-22 DIAGNOSIS — R76.8 HCV ANTIBODY POSITIVE: ICD-10-CM

## 2022-11-22 DIAGNOSIS — R93.2 ABNORMAL ULTRASOUND OF LIVER: ICD-10-CM

## 2022-11-22 DIAGNOSIS — E78.5 HYPERLIPIDEMIA: ICD-10-CM

## 2022-11-22 DIAGNOSIS — Z71.89 VACCINE COUNSELING: ICD-10-CM

## 2022-11-22 DIAGNOSIS — E55.9 VITAMIN D DEFICIENCY: ICD-10-CM

## 2022-11-22 DIAGNOSIS — E66.3 OVERWEIGHT: ICD-10-CM

## 2022-11-22 DIAGNOSIS — E27.8 ADRENAL NODULE: ICD-10-CM

## 2022-11-22 PROBLEM — 874912007: Status: ACTIVE | Noted: 2020-10-23

## 2022-11-22 PROCEDURE — 99214 OFFICE O/P EST MOD 30 MIN: CPT

## 2022-11-22 NOTE — HPI-TODAY'S VISIT:
This is a scheduled follow-up appointment for this patient, a 47 year old /White female, after a previous visit on May 2022, for an evaluation for hepatitis C Ab pos but pcr negative state and hx of adrenal nodule.  A copy of this note has been sent to the referring provider.   Dr HERON Bess is primary.  November 8 labs show hep C antibody negative at 0.2 and was previously 0.3 the year before.  Albumin normal at 4.4 bilirubin 0.4 direct bilirubin 0.11 alk phos 79 AST of 12 and ALT of 14.  Ideal ALT less than 25. We will review this further with you at your upcoming visit on November 22.   April 18 labs show sugar slightly up at 105 previously was 102.  BUN of 13 creatinine 0.79 sodium 141 potassium 4.7 calcium 8.8 albumin 4.3 bilirubin 0.4 alkaline phosphatase 72 AST of 14 and ALT of 16.  Previously AST 18 ALT 15. White blood cell count 5.4 hemoglobin 13.5 plate count 272 MCV 94.  Neutrophils 2.7 and lymphocytes 2.0.    November 12 2021  labs show glucose slightly up at 102 previously last year was 115. BUN of 11 creatinine 0.78 sodium 141 potassium 4.2 chloride 105 calcium 9.1 albumin 4.3 bilirubin 0.5 alkaline phosphatase 70 AST 18 and ALT 15. Ideal ALT is less than 25 so you are doing well there. Last year your AST was 13 and ALT was 12 so these are very similar. White blood cell count 4.6 hemoglobin 13 platelet count 247 MCV 93 and neutrophils 2.2 and lymphocytes 1.8 so these continue to be in the normal range. Hep C antibody was noted to be negative at 0.3. This continues to reconfirm that the hep C antibody was likely a false positive test or a prior exposure that has since faded. True pos hcv ab are positive for life.  Nov 9 2020 wbc 7.9 and hg 13.1 plat 283 and tb 0.5 and alk 92 and ast 13 and alt 12  (ideal alt <19 or 25). na 139 and k 4.4 and glu 115 little up and maybe not fasting? Show local md. bun 13 and cr 0.81. HCV ab negative so no reflex done. So now even the ab test is negative so this is usually a sign of false positive as we teach out hcv pts that they remain antibody positive for life even after get treated.  She says sugars are doing better in 80s.  RECAP:  She was diagnosed with hepatitis C  ab after routine blood testing.  No illness that she recalls pre.  She is asymptomatic. She had no exposures.   Pt here for hep c reported hx.  She was dx in 2010 after ob visit.  She didn't receive tx at that time.  Two years later she was checked again and still positive.   She had a baby in 2007 and was not told she had hep c then.   		 Document Type:	CT Abdomen w/o IV Contrast Document Date:	September 28, 2020 13:19  Document Status:	Auth (Verified) Document Title:	CT Abdomen w/o IV Contrast Performed By:	Jeniffer Smith  Verified By:	Jeniffer Smith on September 28, 2020 13:33  Encounter info:	[id number], [facility], Single Visit OP, 9/28/2020 - 9/28/2020   * Final Report *  Reason For Exam Other;Other; Other; 3.4 cm left adrenal nodule  REPORT EXAM: CT Abdomen w/o IV Contrast  CLINICAL INDICATION: Left adrenal nodule seen on MRI  TECHNIQUE: Noncontrast images were obtained through the abdomen. ESRC.1.7.2  COMPARISON: 7/25/2019  FINDINGS:  Lower Thorax: Normal.  Liver: Normal.  Gallbladder/Biliary Tree: Normal.  Spleen: Normal.  Pancreas: Normal.  Adrenal Glands: Unchanged left adrenal lipid rich adenoma measuring 3.4 x 2.0 cm.   Kidneys/Ureters: Normal.  Gastrointestinal: Normal.   Lymph Nodes: Normal.  Vessels: Normal.  Peritoneum/Retroperitoneum: Normal.  Bones/Soft Tissues: Normal.  IMPRESSION:  Unchanged left adrenal lipid-rich adenoma    Signature Line *** Final ***  Electronically Signed By:  Jeniffer Smith on  09/28/2020 13:33  Dictated by:  Jeniffer Smith  She sees [facility] and she is following her.  Cortisol level. She was Dr Emely Givens.  Document Type:	MRI Abdomen w/ + w/o Contrast Document Date:	July 25, 2019 14:55  Document Status:	Auth (Verified) Document Title:	MRI Abdomen w/ + w/o Contrast Performed By:	Argelia Fairchild  Verified By:	Argelia Fairchild on July 25, 2019 16:32  Encounter info:	[id number], [facility], Single Visit OP, 7/25/2019 - 7/25/2019   * Final Report *  Reason For Exam ADRENAL NODULE  REPORT EXAM: MRI Abdomen w/ + w/o Contrast  CLINICAL INDICATION: ADRENAL NODULE.  TECHNIQUE: Multisequence, multiplanar MRI of the abdomen was performed without and with intravenous contrast. ESRC.2.7.3   CONTRAST: 15 cc of Prohance  COMPARISON: None  FINDINGS:  Lower Thorax: Normal.  Liver: No fat or iron. Normal.   Gallbladder/Biliary Tree: Normal.  Spleen: Normal.  Pancreas: Normal.  Adrenal Glands: Within the left adrenal gland, there is a 3.4 x 2 cm enhancing nodule with intravoxel fat (drop in signal on out of phase images), compatible with a lipid rich adenoma.   Kidneys/Ureters: Normal.  Gastrointestinal: Normal.   Lymph Nodes: Normal.  Vessels: Normal.  Peritoneum/Retroperitoneum: Normal.  Bones/Soft Tissues: Normal.  IMPRESSION:     A 3.4 cm lipid rich adenoma within the left adrenal gland.    Signature Line *** Final ***  Electronically Signed By:  Argelia Fairchild on  07/25/2019 16:32  Dictated by:  Argelia Fairchild   Oct 31 2018 mri with left adrenal nodule 3.2x1.9cm and they had recommend redo mri in 12 m for this. No hepatic steatosis seen and possible focus of shunting and they recommend follow up. area was 9mm size and liver fat 3% and spleen normal.  Mri done as prior u/s suspected in sept 2018 cirrhosis and diffuse fat. Nodularity was seen and no lesions. no hydronephrosis and visualized pancreas normal. Vessels patent.  Sept 2018 hcv ab neg and hep a immune and hep b immune 35 and hcv pcr negative.  March 2019 glu 97 and cr 0.68 and na 138 and k 4.0 and cl 103 and co2 25, tb 0.6 and alk 96 and ast 12 and alt 11.    8/28/28 labs na 139 k 4.2 tb 0.7 alp 92 ast 12 alt 11 hemoglobin a1c 5.2% hep a ab igm neg hep b core ab igm neg hep b surf ag neg hep c ab positive 1.86, pcr negative total cholesterol 230 ldl 146 trig 155 hdl 55 tsh 0.66 vit d low 14 wbc 8.2 hgb 13.3 plt 282 gluc 91 cr 0.68   Plan: 1. She is doing well and hcv ab and given normal labs she is done. 2.  She is most likely a false positive hcv ab and no clinical disease was seen. Good to see that this resolved on own with no intervention, 3. If had hcv would remain pos for life and the fact that it dropped supports that she did not. 4. Reminded to follow up  for adrenal issues with endocrine as has not seen Dr Hough or her coverage in while.   Stressed to pt the need for social distancing and strict handwashing and wearing a mask and to follow any other new or added CDC recommendations as this is an evolving target.  Duration of visit 32 mins with 10 min of prep and then 22 min by healluz elena with over 50% of the time explaining pt's condition and treatment plan and reviewing records.

## 2023-11-22 ENCOUNTER — TELEPHONE ENCOUNTER (OUTPATIENT)
Dept: URBAN - METROPOLITAN AREA CLINIC 86 | Facility: CLINIC | Age: 48
End: 2023-11-22

## 2023-11-22 ENCOUNTER — OFFICE VISIT (OUTPATIENT)
Dept: URBAN - METROPOLITAN AREA TELEHEALTH 2 | Facility: TELEHEALTH | Age: 48
End: 2023-11-22

## 2023-11-22 NOTE — HPI-TODAY'S VISIT:
Pt is a 48 year old /White female, after a previous visit on Nov 2022, for an evaluation for hepatitis C Ab pos but pcr negative state and hx of adrenal nodule.    A copy of this note has been sent to the referring provider.    Dr HERON Bess is primary.    November 8 labs show hep C antibody negative at 0.2 and was previously 0.3 the year before. Albumin normal at 4.4 bilirubin 0.4 direct bilirubin 0.11 alk phos 79 AST of 12 and ALT of 14. Ideal ALT less than 25.  We will review this further with you at your upcoming visit on November 22.      April 18 labs show sugar slightly up at 105 previously was 102. BUN of 13 creatinine 0.79 sodium 141 potassium 4.7 calcium 8.8 albumin 4.3 bilirubin 0.4 alkaline phosphatase 72 AST of 14 and ALT of 16. Previously AST 18 ALT 15.  White blood cell count 5.4 hemoglobin 13.5 plate count 272 MCV 94. Neutrophils 2.7 and lymphocytes 2.0.        November 12 2021 labs show glucose slightly up at 102 previously last year was 115. BUN of 11 creatinine 0.78 sodium 141 potassium 4.2 chloride 105 calcium 9.1 albumin 4.3 bilirubin 0.5 alkaline phosphatase 70 AST 18 and ALT 15. Ideal ALT is less than 25 so you are doing well there. Last year your AST was 13 and ALT was 12 so these are very similar.  White blood cell count 4.6 hemoglobin 13 platelet count 247 MCV 93 and neutrophils 2.2 and lymphocytes 1.8 so these continue to be in the normal range.  Hep C antibody was noted to be negative at 0.3.  This continues to reconfirm that the hep C antibody was likely a false positive test or a prior exposure that has since faded. True pos hcv ab are positive for life.    Nov 9 2020 wbc 7.9 and hg 13.1 plat 283 and tb 0.5 and alk 92 and ast 13 and alt 12 (ideal alt <19 or 25). na 139 and k 4.4 and glu 115 little up and maybe not fasting? Show local md. bun 13 and cr 0.81. HCV ab negative so no reflex done. So now even the ab test is negative so this is usually a sign of false positive as we teach out hcv pts that they remain antibody positive for life even after get treated.    She says sugars are doing better in 80s.    RECAP:  She was diagnosed with hepatitis C ab after routine blood testing. No illness that she recalls pre.    She is asymptomatic. She had no exposures.    Pt here for hep c reported hx.    She was dx in 2010 after ob visit. She didn't receive tx at that time. Two years later she was checked again and still positive. She had a baby in 2007 and was not told she had hep c then.      Document Type: CT Abdomen w/o IV Contrast  Document Date: September 28, 2020 13:19  Document Status: Auth (Verified)  Document Title: CT Abdomen w/o IV Contrast  Performed By: Jeniffer Smith  Verified By: Jeniffer Smith on September 28, 2020 13:33  Encounter info: [id number], [facility], Single Visit OP, 9/28/2020 - 9/28/2020      * Final Report *    Reason For Exam  Other;Other; Other; 3.4 cm left adrenal nodule    REPORT  EXAM: CT Abdomen w/o IV Contrast    CLINICAL INDICATION: Left adrenal nodule seen on MRI    TECHNIQUE: Noncontrast images were obtained through the abdomen. ESRC.1.7.2    COMPARISON: 7/25/2019    FINDINGS:  Lower Thorax: Normal.    Liver: Normal.    Gallbladder/Biliary Tree: Normal.    Spleen: Normal.    Pancreas: Normal.    Adrenal Glands: Unchanged left adrenal lipid rich adenoma measuring 3.4 x 2.0 cm.    Kidneys/Ureters: Normal.    Gastrointestinal: Normal.    Lymph Nodes: Normal.    Vessels: Normal.    Peritoneum/Retroperitoneum: Normal.    Bones/Soft Tissues: Normal.    IMPRESSION:  Unchanged left adrenal lipid-rich adenoma      Signature Line  *** Final ***    Electronically Signed By: Jeniffer Smith  on 09/28/2020 13:33    Dictated by: Jeniffer Smith    She sees [facility] and she is following her. Cortisol level. She was Dr Emely Givens.    Document Type: MRI Abdomen w/ + w/o Contrast  Document Date: July 25, 2019 14:55  Document Status: Auth (Verified)  Document Title: MRI Abdomen w/ + w/o Contrast  Performed By: Argelia Fairchild  Verified By: Argelia Fairchild on July 25, 2019 16:32  Encounter info: [id number], [facility], Single Visit OP, 7/25/2019 - 7/25/2019      * Final Report *    Reason For Exam  ADRENAL NODULE    REPORT  EXAM: MRI Abdomen w/ + w/o Contrast    CLINICAL INDICATION: ADRENAL NODULE.    TECHNIQUE: Multisequence, multiplanar MRI of the abdomen was performed without and with intravenous contrast. ESRC.2.7.3    CONTRAST: 15 cc of Prohance    COMPARISON: None    FINDINGS:  Lower Thorax: Normal.    Liver: No fat or iron. Normal.    Gallbladder/Biliary Tree: Normal.    Spleen: Normal.    Pancreas: Normal.    Adrenal Glands: Within the left adrenal gland, there is a 3.4 x 2 cm enhancing nodule with intravoxel fat (drop in signal on out of phase images), compatible with a lipid rich adenoma.    Kidneys/Ureters: Normal.    Gastrointestinal: Normal.    Lymph Nodes: Normal.    Vessels: Normal.    Peritoneum/Retroperitoneum: Normal.    Bones/Soft Tissues: Normal.    IMPRESSION:  A 3.4 cm lipid rich adenoma within the left adrenal gland.        Signature Line  *** Final ***    Electronically Signed By: Argelia Fairchild  on 07/25/2019 16:32    Dictated by: Argelia Fairchild      Oct 31 2018 mri with left adrenal nodule 3.2x1.9cm and they had recommend redo mri in 12 m for this. No hepatic steatosis seen and possible focus of shunting and they recommend follow up. area was 9mm size and liver fat 3% and spleen normal.    Mri done as prior u/s suspected in sept 2018 cirrhosis and diffuse fat. Nodularity was seen and no lesions. no hydronephrosis and visualized pancreas normal. Vessels patent.    Sept 2018 hcv ab neg and hep a immune and hep b immune 35 and hcv pcr negative.    March 2019 glu 97 and cr 0.68 and na 138 and k 4.0 and cl 103 and co2 25, tb 0.6 and alk 96 and ast 12 and alt 11.      8/28/28 labs  na 139  k 4.2  tb 0.7  alp 92  ast 12  alt 11  hemoglobin a1c 5.2%  hep a ab igm neg  hep b core ab igm neg  hep b surf ag neg  hep c ab positive 1.86, pcr negative  total cholesterol 230  ldl 146  trig 155  hdl 55  tsh 0.66  vit d low 14  wbc 8.2  hgb 13.3  plt 282  gluc 91  cr 0.68      Plan:  1. She is doing well and hcv ab and given normal labs she is done.  2. She is most likely a false positive hcv ab and no clinical disease was seen. Good to see that this resolved on own with no intervention,  3. If had hcv would remain pos for life and the fact that it dropped supports that she did not.  4. Reminded to follow up for adrenal issues with endocrine as has not seen Dr Hough or her coverage in while.      Stressed to pt the need for social distancing and strict handwashing and wearing a mask and to follow any other new or added CDC recommendations as this is an evolving target.    Duration of visit mins with 10 min of prep and then 22 min by healow with over 50% of the time explaining pt's condition and treatment plan and reviewing records.

## 2024-12-06 ENCOUNTER — OFFICE VISIT (OUTPATIENT)
Dept: URBAN - METROPOLITAN AREA TELEHEALTH 2 | Facility: TELEHEALTH | Age: 49
End: 2024-12-06
Payer: COMMERCIAL

## 2024-12-06 ENCOUNTER — LAB OUTSIDE AN ENCOUNTER (OUTPATIENT)
Dept: URBAN - METROPOLITAN AREA TELEHEALTH 2 | Facility: TELEHEALTH | Age: 49
End: 2024-12-06

## 2024-12-06 VITALS — WEIGHT: 149 LBS | HEIGHT: 63 IN | BODY MASS INDEX: 26.4 KG/M2

## 2024-12-06 DIAGNOSIS — E78.5 HYPERLIPIDEMIA: ICD-10-CM

## 2024-12-06 DIAGNOSIS — Z71.89 VACCINE COUNSELING: ICD-10-CM

## 2024-12-06 DIAGNOSIS — R76.8 HCV ANTIBODY POSITIVE: ICD-10-CM

## 2024-12-06 DIAGNOSIS — R93.2 ABNORMAL ULTRASOUND OF LIVER: ICD-10-CM

## 2024-12-06 DIAGNOSIS — Z79.899 HIGH RISK MEDICATION USE: ICD-10-CM

## 2024-12-06 DIAGNOSIS — E66.3 OVERWEIGHT: ICD-10-CM

## 2024-12-06 DIAGNOSIS — K76.9 LIVER LESION: ICD-10-CM

## 2024-12-06 DIAGNOSIS — D32.9 MENINGIOMA: ICD-10-CM

## 2024-12-06 DIAGNOSIS — E27.8 ADRENAL NODULE: ICD-10-CM

## 2024-12-06 DIAGNOSIS — E55.9 VITAMIN D DEFICIENCY: ICD-10-CM

## 2024-12-06 PROCEDURE — 99215 OFFICE O/P EST HI 40 MIN: CPT

## 2024-12-06 NOTE — HPI-TODAY'S VISIT:
Pt is a 48 year old /White female, after a previous visit on Nov 2022, for an evaluation for hepatitis C Ab pos but pcr negative state and hx of adrenal nodule.  A copy of this note has been sent to the referring provider.  Dr HERON Bess is primary provider.  Pt has not done labs or scans and is now back.  No needle stick exposures. Need to check labs and check imaging.  2022 November 8 2022 labs show hep C antibody negative at 0.2 and was previously 0.3 the year before. Albumin normal at 4.4 bilirubin 0.4 direct bilirubin 0.11 alk phos 79 AST of 12 and ALT of 14. Ideal ALT less than 25.  April 18 labs show sugar slightly up at 105 previously was 102. BUN of 13 creatinine 0.79 sodium 141 potassium 4.7 calcium 8.8 albumin 4.3 bilirubin 0.4 alkaline phosphatase 72 AST of 14 and ALT of 16. Previously AST 18 ALT 15.  White blood cell count 5.4 hemoglobin 13.5 plate count 272 MCV 94. Neutrophils 2.7 and lymphocytes 2.0.  November 12 2021 labs show glucose slightly up at 102 previously last year was 115. BUN of 11 creatinine 0.78 sodium 141 potassium 4.2 chloride 105 calcium 9.1 albumin 4.3 bilirubin 0.5 alkaline phosphatase 70 AST 18 and ALT 15. Ideal ALT is less than 25 so you are doing well there. Last year your AST was 13 and ALT was 12 so these are very similar.  White blood cell count 4.6 hemoglobin 13 platelet count 247 MCV 93 and neutrophils 2.2 and lymphocytes 1.8 so these continue to be in the normal range.  Hep C antibody was noted to be negative at 0.3.  This continues to reconfirm that the hep C antibody was likely a false positive test or a prior exposure that has since faded. True pos hcv ab are positive for life.  Nov 9 2020 wbc 7.9 and hg 13.1 plat 283 and tb 0.5  and alk 92 and ast 13 and alt 12 (ideal alt <19 or 25). na 139 and k 4.4 and glu 115 little up and maybe not fasting? Show local md. bun 13 and cr 0.81. HCV ab negative so no reflex done. So now even the ab test is negative so this is usually a sign of false positive as we teach out hcv pts that they remain antibody positive for life even after get treated.  She says sugars are doing better in 80s.  RECAP:  She was diagnosed with hepatitis C ab after routine blood testing. No illness that she recalls pre.   She is asymptomatic. She had no exposures.  Pt here for hep c reported hx.  She was dx in 2010 after ob visit. She didn't receive tx at that time. Two years later she was checked again and still positive. She had a baby in 2007 and was not told she had hep c then.   Document Type: CT Abdomen w/o IV Contrast Document Date: September 28, 2020 13:19 Verified By: Jeniffer Smith on September 28, 2020 13:33 Other;Other; Other; 3.4 cm left adrenal nodule REPORT EXAM: CT Abdomen w/o IV Contrast CLINICAL INDICATION: Left adrenal nodule seen on MRI TECHNIQUE: Noncontrast images were obtained through the abdomen. ESRC.1.7.2 COMPARISON: 7/25/2019 FINDINGS: Lower Thorax: Normal. Liver: Normal. Gallbladder/Biliary Tree: Normal. Spleen: Normal. Pancreas: Normal. Adrenal Glands: Unchanged left adrenal lipid rich adenoma measuring 3.4 x 2.0 cm. Kidneys/Ureters: Normal. Gastrointestinal: Normal.  Lymph Nodes: Normal.Vessels: Normal.Peritoneum/Retroperitoneum: Normal. Bones/Soft Tissues: Normal. IMPRESSION: Unchanged left adrenal lipid-rich adenoma electronically Signed By: Jeniffer Smith on 09/28/2020 13:33  She sees [facility] and she is following her. Cortisol level. She was PRIOR seeing Dr Emely Givens  Document Type: MRI Abdomen w/ + w/o Contrast  Document Date: July 25, 2019 14:55 ADRENAL NODULE REPORT EXAM: MRI Abdomen w/ + w/o Contrast CLINICAL INDICATION: ADRENAL NODULE. TECHNIQUE: Multisequence, multiplanar MRI of the abdomen was performed without and with intravenous contrast. ESRC.2.7.3 CONTRAST: 15 cc of Prohance COMPARISON: None FINDINGS: Lower Thorax: Normal.  Liver: No fat or iron. Normal. Gallbladder/Biliary Tree: Normal. Spleen: Normal. Pancreas: Normal. Adrenal Glands: Within the left adrenal gland, there is a 3.4 x 2 cm enhancing nodule with intravoxel fat (drop in signal on out of phase images), compatible with a lipid rich adenoma. Kidneys/Ureters: Normal. Gastrointestinal: Normal. Lymph Nodes: Normal Vessels: Normal. Peritoneum/Retroperitoneum: Normal. Bones/Soft Tissues: Normal. IMPRESSION: A 3.4 cm lipid rich adenoma within the left adrenal gland. Electronically Signed By: Argelia Fairchild  Oct 31 2018 mri with left adrenal nodule 3.2x1.9cm and they had recommend redo mri in 12 m for this. No hepatic steatosis seen and possible focus of shunting and they recommend follow up. area was 9mm size and liver fat 3% and spleen normal.   Mri done as prior u/s suspected in sept 2018 cirrhosis and diffuse fat. Nodularity was seen and no lesions. no hydronephrosis and visualized pancreas normal. Vessels patent.   Sept 2018 hcv ab neg and hep a immune and hep b immune 35 and hcv pcr negative.  March 2019 glu 97 and cr 0.68 and na 138 and k 4.0 and cl 103 and co2 25, tb 0.6 and alk 96 and ast 12 and alt 11.  8/28 na 139 k 4.2 tb 0.7 alp 92 ast 12 alt 11 hemoglobin a1c 5.2%hep a ab igm neg hep b core ab igm neg hep b surf ag neg hep c ab positive 1.86, pcr negative ldl 146 trig 155  hdl 55 tsh 0.66 vit d low 14 wbc 8.2  hgb 13.3 plt 282 gluc 91  cr 0.68   Plan:  1. She is doing well and check hcv ab be sure that it is negative and check labs. 2. [place] mri to check liver and perfusion issues and adrenal adenoma size and follow. 3. Pending then endocrinologist for the adrenal lesion. 4. Pt she used to see Dr Givens but she left town for [place]. She is to call for follow with [facility]. 5. Plan for a 2m telemed to go over labs and figure out next steps.     Duration of visit 42 mins with 20 min of prep loading old note and info to ecw and  then 22 min by healow with over 50% of the time explaining pt's condition and treatment plan and reviewing records.

## 2024-12-24 ENCOUNTER — TELEPHONE ENCOUNTER (OUTPATIENT)
Dept: URBAN - METROPOLITAN AREA CLINIC 86 | Facility: CLINIC | Age: 49
End: 2024-12-24

## 2024-12-24 LAB
A/G RATIO: 1.3
ABSOLUTE BASOPHILS: 23
ABSOLUTE EOSINOPHILS: 148
ABSOLUTE LYMPHOCYTES: 2086
ABSOLUTE MONOCYTES: 422
ABSOLUTE NEUTROPHILS: 3021
ALBUMIN: 4.3
ALKALINE PHOSPHATASE: 75
ALT (SGPT): 12
AST (SGOT): 13
BASOPHILS: 0.4
BILIRUBIN, TOTAL: 0.5
BUN/CREATININE RATIO: (no result)
BUN: 14
CALCIUM: 9
CARBON DIOXIDE, TOTAL: 29
CHLORIDE: 104
CREATININE: 0.79
EGFR: 92
EOSINOPHILS: 2.6
GLOBULIN, TOTAL: 3.3
GLUCOSE: 99
HEMATOCRIT: 40.7
HEMOGLOBIN: 13.7
HEPATITIS C ANTIBODY: (no result)
LYMPHOCYTES: 36.6
MCH: 30.9
MCHC: 33.7
MCV: 91.7
MONOCYTES: 7.4
MPV: 9.6
NEUTROPHILS: 53
PLATELET COUNT: 243
POTASSIUM: 4.2
PROTEIN, TOTAL: 7.6
RDW: 12.4
RED BLOOD CELL COUNT: 4.44
SODIUM: 140
WHITE BLOOD CELL COUNT: 5.7

## 2024-12-24 NOTE — HPI-TODAY'S VISIT:
Dear Mica Brooke, Dec 23 labs show glucose 99 normal and good to see and as you recall was prior up to 105 when prior done. Please share with primary provider. BUN of 14 and creatinine stable at 0.79. Sodium 140 potassium 4.2 calcium 9.0 albumin 4.3 bili 0.5 alk phos 75 AST lower 13 and ALT lower at 12 with ideal ALT less than 25. WBC 5.7 hemoglobin 13.7 platelet count 243 MCV 91.7 with normal neutrophils and lymphocytes. Hep C antibody negative and good to see that it remains negative as you recall that was prior low positive before and to hep C infection is associated with a HCV antibody for life. Have not seen the MRI back that we ordered.  Was not scheduled?  If not please contact Ximena at [phone] extension [phone]. Happy holidays and a happy new year to you. Dr. Alcaraz

## 2025-01-27 ENCOUNTER — TELEPHONE ENCOUNTER (OUTPATIENT)
Dept: URBAN - METROPOLITAN AREA CLINIC 86 | Facility: CLINIC | Age: 50
End: 2025-01-27

## 2025-01-27 NOTE — HPI-TODAY'S VISIT:
Dear Mica Brooke,   Jan 26 mri: Lower thorax appeared normal. Liver showed no fat or iron and appeared normal to them. Gallbladder/biliary tree was normal. Spleen was normal.  Pancreas was normal. They felt you had an unchanged left adrenal lipid rich adenoma measuring 3.3 x 1.7 cm and please share with local providers. Punctate left renal cysts were seen. Gastrointestinal views, lymph node views and vessel views were normal. Peritoneum and retroperitoneum also appeared normal. You did have a tiny fat-containing umbilical hernia that was seen.   Overall they felt that you had a stable benign left adrenal adenoma.   A previous CT scan from September 2020 suggested that the lipid rich adenoma was 3.4 x 2.0 cm and so it is actually very similar if not a little smaller now.  The liver did not appear to be fatty back then either. Please share these results with your new endocrinologist and primary care provider. Dr. Alcaraz

## 2025-02-24 ENCOUNTER — OFFICE VISIT (OUTPATIENT)
Dept: URBAN - METROPOLITAN AREA TELEHEALTH 2 | Facility: TELEHEALTH | Age: 50
End: 2025-02-24
Payer: COMMERCIAL

## 2025-02-24 VITALS — BODY MASS INDEX: 26.58 KG/M2 | WEIGHT: 150 LBS | HEIGHT: 63 IN

## 2025-02-24 DIAGNOSIS — E27.8 ADRENAL NODULE: ICD-10-CM

## 2025-02-24 DIAGNOSIS — E78.5 HYPERLIPIDEMIA: ICD-10-CM

## 2025-02-24 DIAGNOSIS — D32.9 MENINGIOMA: ICD-10-CM

## 2025-02-24 DIAGNOSIS — K76.9 LIVER LESION: ICD-10-CM

## 2025-02-24 DIAGNOSIS — Z79.899 HIGH RISK MEDICATION USE: ICD-10-CM

## 2025-02-24 DIAGNOSIS — E55.9 VITAMIN D DEFICIENCY: ICD-10-CM

## 2025-02-24 DIAGNOSIS — R93.2 ABNORMAL ULTRASOUND OF LIVER: ICD-10-CM

## 2025-02-24 DIAGNOSIS — Z71.89 VACCINE COUNSELING: ICD-10-CM

## 2025-02-24 DIAGNOSIS — R76.8 HCV ANTIBODY POSITIVE: ICD-10-CM

## 2025-02-24 DIAGNOSIS — E66.3 OVERWEIGHT: ICD-10-CM

## 2025-02-24 PROCEDURE — 99214 OFFICE O/P EST MOD 30 MIN: CPT

## 2025-02-24 NOTE — HPI-TODAY'S VISIT:
Pt is a 49 year old /White female, after Dec 2024 and previous visit on Nov 2022, for an evaluation for hepatitis C Ab pos but pcr negative state and hx of adrenal nodule.  A copy of this note has been sent to the referring provider.  Jan 26 mri: Lower thorax appeared normal. Liver showed no fat or iron and appeared normal to them. Gallbladder/biliary tree was normal. Spleen was normal. Pancreas was normal. They felt you had an unchanged left adrenal lipid rich adenoma measuring 3.3 x 1.7 cm and please share with local providers. Punctate left renal cysts were seen. Gastrointestinal views, lymph node views and vessel views were normal. Peritoneum and retroperitoneum also appeared normal. You did have a tiny fat-containing umbilical hernia that was seen. Overall they felt that you had a stable benign left adrenal adenoma.   A previous CT scan from September 2020 suggested that the lipid rich adenoma was 3.4 x 2.0 cm and so it is actually very similar if not a little smaller now. The liver did not appear to be fatty back then either.   Dec 23 labs show glucose 99 normal and good to see and as you recall was prior up to 105 when prior done. Please share with primary provider. BUN of 14 and creatinine stable at 0.79. Sodium 140 potassium 4.2 calcium 9.0 albumin 4.3 bili 0.5 alk phos 75 AST lower 13 and ALT lower at 12 with ideal ALT less than 25. WBC 5.7 hemoglobin 13.7 platelet count 243 MCV 91.7 with normal neutrophils and lymphocytes.  Hep C antibody negative and good to see that it remains negative as you recall that was prior low positive before and to hep C infection is associated with a HCV antibody for life.  Dr HERON Bess is primary provider.  Pt has not done labs or scans and is now back.  No needle stick exposures. Need to check labs and check imaging.  2022 November 8 2022 labs show hep C antibody negative at 0.2 and was previously 0.3 the year before. Albumin normal at 4.4 bilirubin 0.4 direct bilirubin 0.11 alk phos 79 AST of 12 and ALT of 14. Ideal ALT less than 25.  April 18 labs show sugar slightly up at 105 previously was 102. BUN of 13 creatinine 0.79 sodium 141 potassium 4.7 calcium 8.8 albumin 4.3 bilirubin 0.4 alkaline phosphatase 72 AST of 14 and ALT of 16. Previously AST 18 ALT 15.  White blood cell count 5.4 hemoglobin 13.5 plate count 272 MCV 94. Neutrophils 2.7 and lymphocytes 2.0.  November 12 2021 labs show glucose slightly up at 102 previously last year was 115. BUN of 11 creatinine 0.78 sodium 141 potassium 4.2 chloride 105 calcium 9.1 albumin 4.3 bilirubin 0.5 alkaline phosphatase 70 AST 18 and ALT 15. Ideal ALT is less than 25 so you are doing well there. Last year your AST was 13 and ALT was 12 so these are very similar.  White blood cell count 4.6 hemoglobin 13 platelet count 247 MCV 93 and neutrophils 2.2 and lymphocytes 1.8 so these continue to be in the normal range.  Hep C antibody was noted to be negative at 0.3.  This continues to reconfirm that the hep C antibody was likely a false positive test or a prior exposure that has since faded. True pos hcv ab are positive for life.  Nov 9 2020 wbc 7.9 and hg 13.1 plat 283 and tb 0.5  and alk 92 and ast 13 and alt 12 (ideal alt <19 or 25). na 139 and k 4.4 and glu 115 little up and maybe not fasting? Show local md. bun 13 and cr 0.81. HCV ab negative so no reflex done. So now even the ab test is negative so this is usually a sign of false positive as we teach out hcv pts that they remain antibody positive for life even after get treated.  She says sugars are doing better in 80s.  RECAP:  She was diagnosed with hepatitis C ab after routine blood testing. No illness that she recalls pre.   She is asymptomatic. She had no exposures.  Pt here for hep c reported hx.  She was dx in 2010 after ob visit. She didn't receive tx at that time. Two years later she was checked again and still positive. She had a baby in 2007 and was not told she had hep c then.   Document Type: CT Abdomen w/o IV Contrast Document Date: September 28, 2020 13:19 Verified By: Jeniffer Smith on September 28, 2020 13:33 Other;Other; Other; 3.4 cm left adrenal nodule REPORT EXAM: CT Abdomen w/o IV Contrast CLINICAL INDICATION: Left adrenal nodule seen on MRI TECHNIQUE: Noncontrast images were obtained through the abdomen. ESRC.1.7.2 COMPARISON: 7/25/2019 FINDINGS: Lower Thorax: Normal. Liver: Normal. Gallbladder/Biliary Tree: Normal. Spleen: Normal. Pancreas: Normal. Adrenal Glands: Unchanged left adrenal lipid rich adenoma measuring 3.4 x 2.0 cm. Kidneys/Ureters: Normal. Gastrointestinal: Normal.  Lymph Nodes: Normal.Vessels: Normal.Peritoneum/Retroperitoneum: Normal. Bones/Soft Tissues: Normal. IMPRESSION: Unchanged left adrenal lipid-rich adenoma electronically Signed By: Jeniffer Smith on 09/28/2020 13:33  She sees [facility] and she is following her. Cortisol level. She was PRIOR seeing Dr Emely Givens  Document Type: MRI Abdomen w/ + w/o Contrast  Document Date: July 25, 2019 14:55 ADRENAL NODULE REPORT EXAM: MRI Abdomen w/ + w/o Contrast CLINICAL INDICATION: ADRENAL NODULE. TECHNIQUE: Multisequence, multiplanar MRI of the abdomen was performed without and with intravenous contrast. ESRC.2.7.3 CONTRAST: 15 cc of Prohance COMPARISON: None FINDINGS: Lower Thorax: Normal.  Liver: No fat or iron. Normal. Gallbladder/Biliary Tree: Normal. Spleen: Normal. Pancreas: Normal. Adrenal Glands: Within the left adrenal gland, there is a 3.4 x 2 cm enhancing nodule with intravoxel fat (drop in signal on out of phase images), compatible with a lipid rich adenoma. Kidneys/Ureters: Normal. Gastrointestinal: Normal. Lymph Nodes: Normal Vessels: Normal. Peritoneum/Retroperitoneum: Normal. Bones/Soft Tissues: Normal. IMPRESSION: A 3.4 cm lipid rich adenoma within the left adrenal gland. Electronically Signed By: Argelia Fairchild  Oct 31 2018 mri with left adrenal nodule 3.2x1.9cm and they had recommend redo mri in 12 m for this. No hepatic steatosis seen and possible focus of shunting and they recommend follow up. area was 9mm size and liver fat 3% and spleen normal.   Mri done as prior u/s suspected in sept 2018 cirrhosis and diffuse fat. Nodularity was seen and no lesions. no hydronephrosis and visualized pancreas normal. Vessels patent.   Sept 2018 hcv ab neg and hep a immune and hep b immune 35 and hcv pcr negative.  March 2019 glu 97 and cr 0.68 and na 138 and k 4.0 and cl 103 and co2 25, tb 0.6 and alk 96 and ast 12 and alt 11.  8/28 na 139 k 4.2 tb 0.7 alp 92 ast 12 alt 11 hemoglobin a1c 5.2%hep a ab igm neg hep b core ab igm neg hep b surf ag neg hep c ab positive 1.86, pcr negative ldl 146 trig 155  hdl 55 tsh 0.66 vit d low 14 wbc 8.2  hgb 13.3 plt 282 gluc 91  cr 0.68   Plan:  1. She is staying hcv ab neg and make lissette think prior was a false positive. 2. Pt had no perfusion issues but still have the adrenal adenoma and stable to slightly smaller.  3. Pt used to see Dr Givens and they left [place] and seeing Dr Casillas. 4.  Endocrine can follow the adenoma and I will follow in a year.    Duration of visit 30 mins with 10 min of prep loading old note and info to ecw and  then 20 min 233 to 253 pm by clock as healow clock off due to fluzes for this healow telemed with time spent reviewing pt's condition and tests and treatment plan .

## 2025-04-01 ENCOUNTER — OFFICE VISIT (OUTPATIENT)
Dept: URBAN - METROPOLITAN AREA SURGERY CENTER 13 | Facility: SURGERY CENTER | Age: 50
End: 2025-04-01

## 2025-08-19 ENCOUNTER — APPOINTMENT (OUTPATIENT)
Dept: URBAN - METROPOLITAN AREA CLINIC 45 | Facility: CLINIC | Age: 50
Setting detail: DERMATOLOGY
End: 2025-08-19

## 2025-08-19 DIAGNOSIS — L71.0 PERIORAL DERMATITIS: ICD-10-CM | Status: INADEQUATELY CONTROLLED

## 2025-08-19 PROCEDURE — ? COUNSELING

## 2025-08-19 PROCEDURE — ? FULL BODY SKIN EXAM - DECLINED

## 2025-08-19 PROCEDURE — ? PHOTO-DOCUMENTATION

## 2025-08-19 PROCEDURE — ? PRESCRIPTION MEDICATION MANAGEMENT

## 2025-08-19 PROCEDURE — ? PRESCRIPTION

## 2025-08-19 PROCEDURE — ? MEDICATION COUNSELING

## 2025-08-19 RX ORDER — METRONIDAZOLE 7.5 MG/G
CREAM TOPICAL QHS
Qty: 45 | Refills: 3 | Status: ERX | COMMUNITY
Start: 2025-08-19

## 2025-08-19 RX ORDER — DOXYCYCLINE HYCLATE 100 MG/1
CAPSULE, GELATIN COATED ORAL DAILY
Qty: 20 | Refills: 0 | Status: ERX | COMMUNITY
Start: 2025-08-19

## 2025-08-19 RX ORDER — KETOCONAZOLE 20 MG/G
CREAM TOPICAL
Qty: 60 | Refills: 3 | Status: ERX | COMMUNITY
Start: 2025-08-19

## 2025-08-19 RX ADMIN — KETOCONAZOLE: 20 CREAM TOPICAL at 00:00

## 2025-08-19 RX ADMIN — DOXYCYCLINE HYCLATE: 100 CAPSULE, GELATIN COATED ORAL at 00:00

## 2025-08-19 RX ADMIN — METRONIDAZOLE: 7.5 CREAM TOPICAL at 00:00

## 2025-08-19 ASSESSMENT — LOCATION DETAILED DESCRIPTION DERM: LOCATION DETAILED: LEFT LOWER CUTANEOUS LIP

## 2025-08-19 ASSESSMENT — LOCATION SIMPLE DESCRIPTION DERM: LOCATION SIMPLE: LEFT LIP

## 2025-08-19 ASSESSMENT — LOCATION ZONE DERM: LOCATION ZONE: LIP

## 2025-08-20 ENCOUNTER — RX ONLY (RX ONLY)
Age: 50
End: 2025-08-20

## 2025-08-20 RX ORDER — METRONIDAZOLE 7.5 MG/G
GEL TOPICAL
Qty: 45 | Refills: 4 | Status: ERX | COMMUNITY
Start: 2025-08-20